# Patient Record
Sex: FEMALE | Race: WHITE | Employment: OTHER | URBAN - METROPOLITAN AREA
[De-identification: names, ages, dates, MRNs, and addresses within clinical notes are randomized per-mention and may not be internally consistent; named-entity substitution may affect disease eponyms.]

---

## 2020-04-11 ENCOUNTER — HOSPITAL ENCOUNTER (EMERGENCY)
Age: 65
Discharge: HOME | End: 2020-04-11
Attending: EMERGENCY MEDICINE
Payer: COMMERCIAL

## 2020-04-11 ENCOUNTER — APPOINTMENT (OUTPATIENT)
Dept: GENERAL RADIOLOGY | Age: 65
End: 2020-04-11
Payer: COMMERCIAL

## 2020-04-11 VITALS
BODY MASS INDEX: 28.7 KG/M2 | SYSTOLIC BLOOD PRESSURE: 156 MMHG | WEIGHT: 162 LBS | HEIGHT: 63 IN | RESPIRATION RATE: 16 BRPM | DIASTOLIC BLOOD PRESSURE: 90 MMHG | OXYGEN SATURATION: 96 % | TEMPERATURE: 98.8 F | HEART RATE: 79 BPM

## 2020-04-11 PROCEDURE — 71045 X-RAY EXAM CHEST 1 VIEW: CPT

## 2020-04-11 PROCEDURE — U0002 COVID-19 LAB TEST NON-CDC: HCPCS

## 2020-04-11 PROCEDURE — 99285 EMERGENCY DEPT VISIT HI MDM: CPT

## 2020-04-11 RX ORDER — CALCIUM CARBONATE 500(1250)
500 TABLET ORAL DAILY
COMMUNITY

## 2020-04-11 NOTE — ED PROVIDER NOTES
Emergency Department Encounter    Patient: Zamzam Barbosa  MRN: [id number]  : 1955  Date of Evaluation: 2020  ED Provider:  Ling Acevedo    Triage Chief Complaint:   Shortness of Breath (ABOUT TWO DAYS WHILE WORKING IN THE YARD. POSSIBLE EXPOSURE TO COVID)    [place]:  Zamzam Barbosa is a 60 y.o. female that presents to the ER for evaluation of positive covert 19 exposure, she is afebrile, she has some occasional subjective dyspnea, duration of her symptoms have been potentially up to 48 days. No fevers, shaking, vomiting mental status change. 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 She states she was told to come to the ER for [facility] at 19 testing. She has no active symptoms she is in no respiratory distress, she has no other significant complaints or other review of system complaints. ROS - see HPI, below listed is current ROS at time of my eval:  General:  No fevers, no chills  Eyes:  No recent vison changes, no discharge  ENT:  No sore throat, no nasal congestion, no hearing changes  Cardiovascular:  No chest pain, no palpitations  Respiratory:  + shortness of breath, no cough, no wheezing  Gastrointestinal:  No pain, no nausea, no vomiting, no diarrhea  Musculoskeletal:  No muscle pain, no joint pain  Skin:  No rash, no pruritis  Neurologic:  No speech problems, no headache  Psychiatric:  No anxiety  Genitourinary:  No dysuria, no hematuria  Endocrine:  No unexpected weight gain, no unexpected weight loss  Extremities:  no edema, no pain    History reviewed. No pertinent past medical history. Past Surgical History:   Procedure Laterality Date    HYSTERECTOMY       History reviewed. No pertinent family history.   Social History     Socioeconomic History    Marital status: Single     Spouse name: Not on file    Number of children: Not on file    Years of education: Not on file    Highest education level: Not on file

## 2020-04-11 NOTE — ED NOTES
Pt sitting up in bed, no acute distress, speaking in complete sentence. Respirations even and easy.       Adonis Tejada, [address] [facility]  04/11/20 [id number]

## 2020-04-13 ENCOUNTER — CARE COORDINATION (OUTPATIENT)
Dept: CARE COORDINATION | Age: 65
End: 2020-04-13

## 2020-04-13 LAB — SARS-COV-2, PCR: NOT DETECTED

## 2020-04-13 NOTE — CARE COORDINATION
Attempted outreach call for ED follow-up and COVID19 monitoring; left a VM with [facility] call-back information. It appears the patient was tested for COVID-19 and it came back negative. If no return call, will try again tomorrow. No future appointments. Teo SUAREZ, RN  Ambulatory Care Manager  [phone]  [email or web address]

## 2020-04-27 ENCOUNTER — CARE COORDINATION (OUTPATIENT)
Dept: CARE COORDINATION | Age: 65
End: 2020-04-27

## 2020-04-27 NOTE — CARE COORDINATION
You Patient resolved from the Care Transitions episode on 4/27/20  Patient/family has been provided the following resources and education related to COVID-19:                         Signs, symptoms and red flags related to COVID-19            CDC exposure and quarantine guidelines            [facility] exposure contact - [phone]            Contact for their local Department of Health           Patient currently reports that the following symptoms have improved:  no new/worsening symptoms; \"I'm doing fine. \"     No further outreach scheduled with this CTN/ACM. Episode of Care resolved. Patient has this CTN/ACM contact information if future needs arise. No future appointments. Malika SUAREZ, RN  Ambulatory Care Manager  [phone]  [email or web address]